# Patient Record
(demographics unavailable — no encounter records)

---

## 2025-02-12 NOTE — HISTORY OF PRESENT ILLNESS
[Home] : at home, [unfilled] , at the time of the visit. [Other Location: e.g. Home (Enter Location, City,State)___] : at [unfilled] [Telehealth (audio & video)] : This visit was provided via telehealth using real-time 2-way audio visual technology. [FreeTextEntry3] : Roz Martinez [FreeTextEntry8] : Roz Martinez: [phone] 16 y F concerned that she swallowed the tab of an aluminum soda can.  Occurred approx 1 hour ago.  Pt is not certain but believes she swallowed the tab. No symptoms -- no sore throat, chest pain, abd pain, back pain.  No cough, wheeze, trouble breathing or speaking.  No h/o dysphagia and has never seen a GI doctor.  No complaints currently.  Mom is calling for guidance.

## 2025-02-12 NOTE — ASSESSMENT
[FreeTextEntry1] : Discussed with mom and patient that soda can tabs are by in large benign when swallowed and the chance of impaction/obstruction, perforation, or bleeding is exceedingly rare.  Discussed XRs at this time are not necessary as there is no clinical concern for any perf or obstruction.  Gave anticipatory precautions, recommended bland high fiber foods and stool softener for the next few days.  Advised close pediatrician f/u in 24-48 hours.  Provided pt and mom very strict ED precautions.

## 2025-02-12 NOTE — PHYSICAL EXAM
[de-identified] : PLEASE SEE HPI FOR ADDITIONAL RELEVANT PHYSICAL EXAM FINDINGS GENERAL: no acute distress, nontoxic HEAD: normocephalic EYES: no scleral icterus NECK: trachea midline, Full ROM/supple RESP: no respiratory distress, no obvious wheeze or stridor, no accessory muscle use noted ABD: nondistended MSK: no gross deformity NEURO: alert & fully oriented SKIN: no rash PSYCH: cooperative, good insight, appropriate, fluent speech